# Patient Record
Sex: MALE | Race: WHITE | Employment: UNEMPLOYED | URBAN - NONMETROPOLITAN AREA
[De-identification: names, ages, dates, MRNs, and addresses within clinical notes are randomized per-mention and may not be internally consistent; named-entity substitution may affect disease eponyms.]

---

## 2020-12-23 ENCOUNTER — TELEPHONE (OUTPATIENT)
Dept: INFUSION THERAPY | Age: 50
End: 2020-12-23

## 2020-12-23 NOTE — TELEPHONE ENCOUNTER
Left message with [facility] medical records in [place] for records request on pt to be faxed to [phone]. Spoke with [facility] [address] [facility] [place]) for records as well. States she has two ER visits and labs/imaging she will send to this fax number. No other records known.

## 2021-01-18 DIAGNOSIS — D72.819 LEUKOPENIA, UNSPECIFIED TYPE: Primary | ICD-10-CM

## 2021-01-18 NOTE — PROGRESS NOTES
Serology studies 11/25/2020 at [facility]  · WBC 3.1  · RBC 4.25  · HGB 13.6  · HCT 41.3  · MCV 97.2  · MCH 32  · MCHC 32.9  ·   · ANC 1.53    CBC at initial consultation on 1/19/2021 documented a WBC of 2.77, ANC 1.35, ALC 0.97, ANC 0.28, hemoglobin 13.3, MCV 96.1 and a platelet count of 452,671. HEMATOLOGIC HISTORY:  Diagnosis:  · Leukopenia with neutropenia  · Mild thrombocytopenia      Treatment summary:  · Work-up in progress        Past Medical History:    Past Medical History:   Diagnosis Date    Anxiety     Vertigo     WBC decreased        Past Surgical History:    No past surgical history on file. Current Medications:    Current Outpatient Medications   Medication Sig Dispense Refill    meclizine (ANTIVERT) 25 MG tablet       vitamin D (ERGOCALCIFEROL) 1.25 MG (04624 UT) CAPS capsule Take 1 capsule by mouth once a week 8 capsule 0    levothyroxine (SYNTHROID) 50 MCG tablet Take 1 tablet by mouth daily 90 tablet 1     No current facility-administered medications for this visit. Allergies: No Known Allergies    Social History:    Social History     Tobacco Use    Smoking status: Current Every Day Smoker     Packs/day: 1.00     Years: 41.00     Pack years: 41.00     Types: Cigarettes     Start date: 1/1/1980    Smokeless tobacco: Never Used   Substance Use Topics    Alcohol use: Not on file    Drug use: Not on file       Family History:   No family history on file. Vitals:  Vitals:    01/19/21 1116   BP: 110/68   Pulse: 107   Temp: 97.7 °F (36.5 °C)   TempSrc: Temporal   SpO2: 98%   Weight: 116 lb 14.4 oz (53 kg)   Height: 5' 9.5\" (1.765 m)        Subjective   REVIEW OF SYSTEMS:   Review of Systems   Constitutional: Positive for fatigue and unexpected weight change. Negative for chills, diaphoresis and fever. HENT: Positive for dental problem. Negative for congestion, ear pain, hearing loss, nosebleeds, sore throat and tinnitus. Eyes: Negative. Negative for pain, discharge and redness. Respiratory: Negative. Negative for cough, shortness of breath and wheezing. Cardiovascular: Negative. Negative for chest pain, palpitations and leg swelling. Gastrointestinal: Negative. Negative for abdominal pain, blood in stool, constipation, diarrhea, nausea and vomiting. Endocrine: Negative for polydipsia. Genitourinary: Negative for dysuria, flank pain, frequency, hematuria and urgency. Musculoskeletal: Negative. Negative for back pain, myalgias and neck pain. Skin: Negative. Negative for rash. Neurological: Positive for dizziness, weakness and light-headedness. Negative for tremors, seizures and headaches. Hematological: Does not bruise/bleed easily. Psychiatric/Behavioral: Positive for sleep disturbance. The patient is not nervous/anxious. Objective   PHYSICAL EXAM:  Physical Exam  Vitals signs reviewed. Constitutional:       General: He is not in acute distress. Appearance: He is cachectic. He is ill-appearing. He is not diaphoretic. HENT:      Head: Normocephalic and atraumatic. Mouth/Throat:      Dentition: Dental caries and dental abscesses present. Pharynx: Uvula midline. Tonsils: No tonsillar exudate. Eyes:      General: Lids are normal. No scleral icterus. Right eye: No discharge. Left eye: No discharge. Conjunctiva/sclera: Conjunctivae normal.      Pupils: Pupils are equal, round, and reactive to light. Neck:      Musculoskeletal: Normal range of motion and neck supple. Thyroid: No thyroid mass or thyromegaly. Vascular: No JVD. Trachea: Trachea normal. No tracheal deviation. Cardiovascular:      Rate and Rhythm: Normal rate and regular rhythm. Heart sounds: Normal heart sounds. No murmur. No friction rub. No gallop. Pulmonary:      Effort: Pulmonary effort is normal. No respiratory distress. Breath sounds: Normal breath sounds. No wheezing or rales. Chest:      Chest wall: No tenderness. Abdominal:      General: Bowel sounds are normal. There is no distension. Palpations: Abdomen is soft. There is no mass. Tenderness: There is no abdominal tenderness. There is no guarding or rebound. Hernia: No hernia is present. Musculoskeletal:         General: No tenderness or deformity. Comments: Range of motion within normal limits x4 extremities  Muscle wasting   Skin:     General: Skin is warm. Coloration: Skin is not pale. Findings: No erythema or rash. Neurological:      Mental Status: He is alert and oriented to person, place, and time. Cranial Nerves: No cranial nerve deficit. Coordination: Coordination normal.   Psychiatric:         Behavior: Behavior normal.         Thought Content: Thought content normal.         Labs reviewed today:  Lab Results   Component Value Date    WBC 2.6 (L) 01/19/2021    HGB 12.8 (L) 01/19/2021    HCT 37.0 (L) 01/19/2021    MCV 91 01/19/2021     01/19/2021     Lab Results   Component Value Date    NEUTROABS 1.5 01/19/2021       ASSESSMENT/PLAN:      1. Anemia associated with nutritional deficiency, hemoglobin 13.3 with an MCV of 96.1. Denied known history of iron or vitamin B12 deficiency. Poor oral dentition limiting diet to soft foods only    Labs to be obtained today to evaluate for mineral/vitamin deficiency:  - Comprehensive Metabolic Panel; Future  - Ferritin; Future  - Folate; Future  - Iron and TIBC; Future  - Lactate Dehydrogenase; Future  - Vitamin B12; Future  - Reticulocytes; Future  - Erythropoietin; Future  - Copper, Serum; Future  - Zinc; Future    2. Leukopenia with neutropenia denies any febrile illness. Reports occasional tooth abscess otherwise denies chronic infections.   WBC 3.77, ANC 1.35, ALC 0.97 and ANC 0.28    - Path Review, Smear; Future -Recommended to monitor closely for febrile illness and contact the clinic with a temperature greater than 100.4    3. Thrombocytopenia, mild with a platelet count of 696,941. Denies any significant ecchymosis. Denies any alcohol or illicit drug use. Labs to be obtained to rule out chronic inflammatory process, liver disease, and autoimmune disorder  - C-Reactive Protein; Future  - SUBHASH Screen with Reflex; Future  - D-Dimer, Quantitative; Future  - Fibrinogen; Future  - Hepatitis Panel, Acute; Future  - Sedimentation Rate; Future  - Rheumatoid Factor; Future  - Protime-INR; Future  - Protime/INR & PTT; Future    4. Chronic fatigue that has increased over the past 6 months. Reports poor sleeping habits due to routinely playing video games. Labs to be obtained to rule out hypothyroidism, vitamin D deficiency and HIV  - TSH without Reflex; Future  - HIV Rapid 1&2; Future  - Vitamin D 25 Hydroxy; Future    5. Recent weight loss, differential diagnosis to include poor oral mentation limiting dietary intake to soft foods only and malnutrition      FOLLOW UP:  1. Follow-up appointment given for 3 weeks  2. Continue to follow with other medical providers as recommended    Discussed precautions related to [address] and being at increased risk. Discussed proper handwashing to be done frequently, limit exposure to other individuals and maintain social distancing of 6 feet. Recommend contacting primary care provider if having respiratory symptoms for further recommendations and consideration for testing.     (Please note that portions of this note were completed with a voice recognition program. Efforts were made to edit the dictations but occasionally words are mis-transcribed,  Also, portions of this note have been copied forward, however, changed to reflect the most current clinical status of this patient.) Sarah Hook, sirena scribing for ROCIO Kemp. Electronically signed by Keeley Wise RN on 1/19/2021 at 2:56 PM CST. I, ROCIO Esposito personally performed the services described in this documentation as scribed by Keeley Wise RN in my presence and is both accurate and complete.     Electronically signed by ROCIO Kemp on 1/24/2021 at 3:16 PM

## 2021-01-19 ENCOUNTER — HOSPITAL ENCOUNTER (OUTPATIENT)
Dept: INFUSION THERAPY | Age: 51
Discharge: HOME | End: 2021-01-19
Payer: MEDICAID

## 2021-01-19 ENCOUNTER — TELEPHONE (OUTPATIENT)
Dept: HEMATOLOGY | Age: 51
End: 2021-01-19

## 2021-01-19 ENCOUNTER — OFFICE VISIT (OUTPATIENT)
Dept: HEMATOLOGY | Age: 51
End: 2021-01-19
Payer: MEDICAID

## 2021-01-19 VITALS
WEIGHT: 116.9 LBS | OXYGEN SATURATION: 98 % | HEIGHT: 70 IN | DIASTOLIC BLOOD PRESSURE: 68 MMHG | TEMPERATURE: 97.7 F | SYSTOLIC BLOOD PRESSURE: 110 MMHG | BODY MASS INDEX: 16.73 KG/M2 | HEART RATE: 107 BPM

## 2021-01-19 DIAGNOSIS — D50.8 OTHER IRON DEFICIENCY ANEMIA: ICD-10-CM

## 2021-01-19 DIAGNOSIS — E03.9 HYPOTHYROIDISM, UNSPECIFIED TYPE: Primary | ICD-10-CM

## 2021-01-19 DIAGNOSIS — D70.8 OTHER NEUTROPENIA (HCC): ICD-10-CM

## 2021-01-19 DIAGNOSIS — D72.819 LEUKOPENIA, UNSPECIFIED TYPE: ICD-10-CM

## 2021-01-19 DIAGNOSIS — D53.9 ANEMIA ASSOCIATED WITH NUTRITIONAL DEFICIENCY: Primary | ICD-10-CM

## 2021-01-19 DIAGNOSIS — R53.83 OTHER FATIGUE: ICD-10-CM

## 2021-01-19 DIAGNOSIS — D69.6 THROMBOCYTOPENIA (HCC): ICD-10-CM

## 2021-01-19 DIAGNOSIS — R63.4 RECENT WEIGHT LOSS: ICD-10-CM

## 2021-01-19 LAB
ALBUMIN SERPL-MCNC: 4.1 G/DL (ref 3.5–5.2)
ALP BLD-CCNC: 95 U/L (ref 40–130)
ALT SERPL-CCNC: 11 U/L (ref 21–72)
ANION GAP SERPL CALCULATED.3IONS-SCNC: 9 MMOL/L (ref 7–19)
AST SERPL-CCNC: 21 U/L (ref 17–59)
BASOPHILS ABSOLUTE: 0.02 K/UL (ref 0.01–0.08)
BASOPHILS RELATIVE PERCENT: 0.7 % (ref 0.1–1.2)
BILIRUB SERPL-MCNC: 0.7 MG/DL (ref 0.2–1.3)
BUN BLDV-MCNC: 7 MG/DL (ref 9–20)
CALCIUM SERPL-MCNC: 9 MG/DL (ref 8.4–10.2)
CHLORIDE BLD-SCNC: 102 MMOL/L (ref 98–111)
CO2: 30 MMOL/L (ref 22–29)
CREAT SERPL-MCNC: 0.9 MG/DL (ref 0.6–1.2)
EOSINOPHILS ABSOLUTE: 0.15 K/UL (ref 0.04–0.54)
EOSINOPHILS RELATIVE PERCENT: 5.4 % (ref 0.7–7)
FERRITIN: 80.1 NG/ML (ref 17.9–464)
FOLATE: 8 NG/ML (ref 2.7–20)
GFR NON-AFRICAN AMERICAN: >60
GLUCOSE BLD-MCNC: 93 MG/DL (ref 74–106)
HCT VFR BLD CALC: 39 % (ref 40.1–51)
HEMOGLOBIN: 13.3 G/DL (ref 13.7–17.5)
IRON SATURATION: 25 % (ref 14–50)
IRON: 101 UG/DL (ref 49–181)
LACTATE DEHYDROGENASE: 297 U/L (ref 313–618)
LYMPHOCYTES ABSOLUTE: 0.97 K/UL (ref 1.18–3.74)
LYMPHOCYTES RELATIVE PERCENT: 35 % (ref 19.3–53.1)
MCH RBC QN AUTO: 32.8 PG (ref 25.7–32.2)
MCHC RBC AUTO-ENTMCNC: 34.1 G/DL (ref 32.3–36.5)
MCV RBC AUTO: 96.1 FL (ref 79–92.2)
MONOCYTES ABSOLUTE: 0.28 K/UL (ref 0.24–0.82)
MONOCYTES RELATIVE PERCENT: 10.1 % (ref 4.7–12.5)
NEUTROPHILS ABSOLUTE: 1.35 K/UL (ref 1.56–6.13)
NEUTROPHILS RELATIVE PERCENT: 48.8 % (ref 34–71.1)
PDW BLD-RTO: 12.1 % (ref 11.6–14.4)
PLATELET # BLD: 139 K/UL (ref 163–337)
PMV BLD AUTO: 10.1 FL (ref 7.4–10.4)
POTASSIUM SERPL-SCNC: 3.5 MMOL/L (ref 3.5–5.1)
RBC # BLD: 4.06 M/UL (ref 4.63–6.08)
SODIUM BLD-SCNC: 141 MMOL/L (ref 137–145)
TOTAL IRON BINDING CAPACITY: 406 UG/DL (ref 261–462)
TOTAL PROTEIN: 7 G/DL (ref 6.3–8.2)
TSH SERPL DL<=0.05 MIU/L-ACNC: 10.3 UIU/ML (ref 0.47–4.68)
VITAMIN B-12: 333 PG/ML (ref 239–931)
WBC # BLD: 2.77 K/UL (ref 4.23–9.07)

## 2021-01-19 PROCEDURE — 82746 ASSAY OF FOLIC ACID SERUM: CPT

## 2021-01-19 PROCEDURE — 80053 COMPREHEN METABOLIC PANEL: CPT

## 2021-01-19 PROCEDURE — 99202 OFFICE O/P NEW SF 15 MIN: CPT

## 2021-01-19 PROCEDURE — 83540 ASSAY OF IRON: CPT

## 2021-01-19 PROCEDURE — 83615 LACTATE (LD) (LDH) ENZYME: CPT

## 2021-01-19 PROCEDURE — 84443 ASSAY THYROID STIM HORMONE: CPT

## 2021-01-19 PROCEDURE — 83550 IRON BINDING TEST: CPT

## 2021-01-19 PROCEDURE — 85025 COMPLETE CBC W/AUTO DIFF WBC: CPT

## 2021-01-19 PROCEDURE — 99204 OFFICE O/P NEW MOD 45 MIN: CPT | Performed by: NURSE PRACTITIONER

## 2021-01-19 PROCEDURE — 82728 ASSAY OF FERRITIN: CPT

## 2021-01-19 PROCEDURE — 82607 VITAMIN B-12: CPT

## 2021-01-19 RX ORDER — MECLIZINE HYDROCHLORIDE 25 MG/1
TABLET ORAL
COMMUNITY
Start: 2020-12-21

## 2021-01-19 RX ORDER — LEVOTHYROXINE SODIUM 0.05 MG/1
50 TABLET ORAL DAILY
Qty: 90 TABLET | Refills: 1 | Status: SHIPPED
Start: 2021-01-19 | End: 2021-03-23 | Stop reason: DRUGHIGH

## 2021-01-19 NOTE — TELEPHONE ENCOUNTER
Spoke with Karissa Walker related to his TSH of 10.300 indicating diagnosis of hypothyroidism and needed to be initiated on Synthroid 50 mcg daily. He voiced understanding and prescription was sent to [facility].

## 2021-01-22 ENCOUNTER — CLINICAL DOCUMENTATION (OUTPATIENT)
Dept: HEMATOLOGY | Age: 51
End: 2021-01-22

## 2021-01-22 DIAGNOSIS — E55.9 VITAMIN D DEFICIENCY: Primary | ICD-10-CM

## 2021-01-22 RX ORDER — ERGOCALCIFEROL 1.25 MG/1
50000 CAPSULE ORAL WEEKLY
Qty: 8 CAPSULE | Refills: 0 | Status: SHIPPED | OUTPATIENT
Start: 2021-01-22

## 2021-01-22 NOTE — PROGRESS NOTES
Please call Mayraish Rut and inform him that he has vitamin D deficiency and I sent a prescription for vitamin D 50,000 units weekly x8 to [facility]. After completing the 8-week dosing he will need to go to OTC vitamin D 2000 units daily.       Thanks ROCIO Zuniga, ADINAS

## 2021-01-24 ASSESSMENT — ENCOUNTER SYMPTOMS
EYE PAIN: 0
ABDOMINAL PAIN: 0
BLOOD IN STOOL: 0
VOMITING: 0
SHORTNESS OF BREATH: 0
COUGH: 0
DIARRHEA: 0
EYE DISCHARGE: 0
CONSTIPATION: 0
GASTROINTESTINAL NEGATIVE: 1
EYE REDNESS: 0
NAUSEA: 0
EYES NEGATIVE: 1
SORE THROAT: 0
WHEEZING: 0
RESPIRATORY NEGATIVE: 1
BACK PAIN: 0

## 2021-02-05 ASSESSMENT — ENCOUNTER SYMPTOMS
WHEEZING: 0
EYE PAIN: 0
BLOOD IN STOOL: 0
EYE REDNESS: 0
SHORTNESS OF BREATH: 0
DIARRHEA: 0
VOMITING: 0
CONSTIPATION: 0
BACK PAIN: 0
SORE THROAT: 0
ABDOMINAL PAIN: 0
EYES NEGATIVE: 1
GASTROINTESTINAL NEGATIVE: 1
NAUSEA: 0
EYE DISCHARGE: 0

## 2021-02-05 NOTE — PROGRESS NOTES
Progress Note      Pt Name: Cathy Love  YOB: 1970  MRN: [id number]    Date of evaluation: 2/8/2021  History Obtained From:  patient, electronic medical record    CHIEF COMPLAINT:    Chief Complaint   Patient presents with    Follow-up     Leukopenia with neutropenia    Anemia    Discuss Labs    Other     Hypothyroidism     HISTORY OF PRESENT ILLNESS:    Cathy Love is a 46 y.o.  male returns today in follow-up for leukopenia with neutropenia and mild thrombocytopenia with initial work-up revealing vitamin D deficiency and hypothyroidism. Padma Pinto presented initially on 1/19/2021 with complaint of lightheadedness with episodes of dizziness, fatigue, anxiety, and weight loss with a good appetite and suggested his weight loss is from poor oral dentation with frequent dental abscesses (reported 10 pound weight loss over the past 2 months). He denied any febrile illness, night sweats or enlarged lymph nodes. Padma Pinto presents today with continued complaint dizziness and reports recently developing a cough with clear secretions. Denies any shortness of air or chest pain. He continues to deny any B symptoms or other acute illness.     I reviewed the following lab results and recommendations with Padma Pinto today:    Serology studies on 1/19/2021:  · TSH 10.300  · Vitamin B-12 333  · Folate 8.0  · Ferritin 80.1  ·  ([id number])  · Iron 101/iron saturation 25%  · TIBC 406  · Creatinine 0.9/GFR >60  · Calcium 9.0  · Total protein 7.0  · Albumin 4.1  · Total bilirubin 0.7  · Alkaline phosphatase 95  · ALT 11/AST 21  · Hepatitis panel negative  · CRP 2  · Sed rate 12  · RA <10  · Erythropoietin 8.5  · Absolute reticulocyte count 0.8%  · Zinc 46  · Copper 84  · Fibrinogen 300  · SUBHASH negative  · D-dimer <0.20  · PTT 29  · PT 10.9/INR 1.0  · HIV nonreactive  · Vitamin D 6.5 · Peripheral blood smear-leukopenia confirmed no morphologic abnormality. Normochromic, normocytic anemia without significant morphology abnormality. Platelet count morphology normal.    Initiated on vitamin D 50,000 unit with x8 weeks on 1/22/2021 to be followed by oral vitamin D 2000 units daily and on levothyroxine 50 mcg daily on 1/19/2021. He reports currently taking both medications as prescribed. CBC today continues to indicate neutropenia with leukopenia and mild thrombocytopenia with a WBC of 2.54, ANC 1.20, ALC 0.91, hemoglobin 12.6, MCV 99.7 and a platelet count of 911,904. A bone marrow aspiration biopsy and an ultrasound of the abdomen is warranted for further evaluation. HEMATOLOGIC HISTORY:  Diagnosis:  · Leukopenia with neutropenia  · Mild thrombocytopenia  · Vitamin D deficiency  · Hypothyroidism      Treatment summary:  · 1/22/2021 initiated on vitamin D 50,000 unit with x8 weeks to be followed by oral vitamin D 2000 units daily  · 1/19/2021 initiated levothyroxine 50 mcg daily. · Work-up in progress    Bladimir Pineda was seen on 1/19/2021 in initial hematology consultation for leukopenia with neutropenia, mild thrombocytopenia, fatigue and weight loss. lBadimir Pineda was referred from ROCIO Ward for further evaluation and treatment recommendations. Bladimir Pineda presented with complaint of lightheadedness with episodes of dizziness, fatigue, anxiety, and weight loss with a good appetite and suggested his weight loss is from poor oral dentation with frequent dental abscesses (reported 10 pound weight loss over the past 2 months). He denied any febrile illness, night sweats or enlarged lymph nodes. 's physical examination at initial consultation revealed a cachectic skeleton with no noted skin abnormalities or concerns, muscle wasting was noted. No enlarged lymph nodes or organomegaly identified. Very poor dentition with multiple dental caries and missing teeth. He reported smoking 1 pack of cigarettes daily for 41 years and denied any current alcohol or illicit drug use. He reported heavy alcohol use for 24 years with at least a half a case of beer and other alcoholic products for 24 years, quit at the age of 34 and he reported a history of illicit drug use of multiple substances from the age of [age]. He reported not sleeping well and stated that he is a  (clarified that he sat and played video games the majority of his time). 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 He denied routinely being seen by a medical provider for health maintenance. I reviewed the following available serology and radiographic studies at initial consultation appointment on 1/19/2021:    Chest x-ray 12/4/2020 at [facility] revealed no acute cardiac or pulmonary abnormality identified. Serology studies 11/25/2020 at [facility]  · WBC 3.1  · RBC 4.25  · HGB 13.6  · HCT 41.3  · MCV 97.2  · MCH 32  · MCHC 32.9  ·   · ANC 1.53    CBC at initial consultation on 1/19/2021 documented a WBC of 2.77, ANC 1.35, ALC 0.97, ANC 0.28, hemoglobin 13.3, MCV 96.1 and a platelet count of 210,787.     Serology studies on 1/19/2021:  · TSH 10.300  · Folate 8.0  · Ferritin 80.1  · Vitamin B12 333  ·  (194-431)  · Iron 101/iron saturation 25%  · TIBC 406  · Creatinine 0.9/GFR >60  · Calcium 9.0  · Total protein 7.0  · Albumin 4.1  · Total bilirubin 0.7  · Alkaline phosphatase 95  · ALT 11/AST 21  · Hepatitis panel negative  · CRP 2  · Sed rate 12  · RA <10  · Erythropoietin 8.5  · Absolute reticulocyte count 0.8%  · Zinc 46  · Copper 84  · Fibrinogen 300  · SUBHASH negative  · D-dimer <0.20 · PTT 29  · PT 10.9/INR 1.0  · HIV nonreactive  · Vitamin D 6.5  · Peripheral blood smear-leukopenia confirmed no morphologic abnormality. Normochromic, normocytic anemia without significant morphology abnormality. Platelet count morphology normal.    Initiated on vitamin D 50,000 unit with x8 weeks on 1/22/2021 to be followed by oral vitamin D 2000 units daily and on levothyroxine 50 mcg daily on 1/19/2021. Past Medical History:    Past Medical History:   Diagnosis Date    Anxiety     Vertigo     WBC decreased        Past Surgical History:    History reviewed. No pertinent surgical history. Current Medications:    Current Outpatient Medications   Medication Sig Dispense Refill    busPIRone (BUSPAR) 5 MG tablet Take 5 mg by mouth 3 times daily      vitamin D (ERGOCALCIFEROL) 1.25 MG (60095 UT) CAPS capsule Take 1 capsule by mouth once a week 8 capsule 0    meclizine (ANTIVERT) 25 MG tablet       levothyroxine (SYNTHROID) 50 MCG tablet Take 1 tablet by mouth daily 90 tablet 1     No current facility-administered medications for this visit. Allergies: No Known Allergies    Social History:    Social History     Tobacco Use    Smoking status: Current Every Day Smoker     Packs/day: 1.00     Years: 41.00     Pack years: 41.00     Types: Cigarettes     Start date: 1/1/1980    Smokeless tobacco: Never Used   Substance Use Topics    Alcohol use: Not Currently     Comment: history of alcoholism    Drug use: Not on file       Family History:   History reviewed. No pertinent family history. Vitals:  Vitals:    02/08/21 1009   BP: 116/76   Pulse: 71   Temp: 97.5 °F (36.4 °C)   SpO2: 97%   Weight: 119 lb 14.4 oz (54.4 kg)   Height: 5' 9.5\" (1.765 m)        Subjective   REVIEW OF SYSTEMS:   Review of Systems   Constitutional: Positive for fatigue. Negative for chills, diaphoresis and fever. · Copper 84    -Encouraged increasing protein and caloric intake  -CBC today      2. Leukopenia with neutropenia denies any febrile illness. Reports occasional tooth abscess otherwise denies chronic infections. CBC today continues to indicate neutropenia with leukopenia with a WBC of 2.54, ANC 1.20, and ALC 0.91. Continues to deny any febrile illness or infectious sequelae. Labs on 1/19/2021:  · Hepatitis panel negative  · CRP 2  · Sed rate 12  · RA <10  · HIV nonreactive  · Peripheral blood smear-leukopenia confirmed no morphologic abnormality. Normochromic, normocytic anemia without significant morphology abnormality. Platelet count morphology normal.      -Schedule bone marrow aspiration biopsy  -Contact the clinic with febrile illness with a temp greater than 100.4 or any other infectious sequelae      3. Thrombocytopenia, platelet count of 362,218 today, suspect this may be inaccurate due to blood sample obtained via fingerstick. At initial consultation blood draw from fingerstick documented a platelet count of 109,745 and on peripheral blood smear documented a platelet count of 915,244. Denies any significant ecchymosis and denies bleeding. Continues to deny illicit drug or alcohol use, has a significant history of both. Labs on 1/19/2021:  · Fibrinogen 300  · SUBHASH negative  · D-dimer <0.20  · PTT 29  · PT 10.9/INR 1.0    -Schedule ultrasound of the abdomen  -Monitor CBC    4. Hypothyroidism with chronic fatigue that has increased over the past 6 months. Reports poor sleeping habits due to routinely playing video games. TSH 10.300 on 1/19/2021 and initiated on levothyroxine 50 mcg daily.    -Continue levothyroxine 50 mcg daily  -Repeat TSH level in 2 months    5.  Recent weight loss, differential diagnosis to include poor oral mentation limiting dietary intake to soft foods only and malnutrition 6.  Vitamin D deficiency- Vitamin D 6.5 on 1/19/2021 and initiated on vitamin D 50,000 unit with x8 weeks on 1/22/2021 to be followed by oral vitamin D 2000 units daily and on   He reports currently taking as prescribed. -Continue vitamin D supplement    I discussed all of the above findings included in the assessment and plan with the patient and the patient is in agreement to move forward with current recommendations/treatment. I have addressed all of their questions and concerns that were verbalized. FOLLOW UP:  1. Follow-up appointment given for 1 week for bone marrow aspiration biopsy  2. Follow-up appointment to be given for 2 weeks after bone marrow aspiration biopsy  3. Continue to follow with other medical providers as recommended    Discussed precautions related to [address] and being at increased risk. Discussed proper handwashing to be done frequently, limit exposure to other individuals and maintain social distancing of 6 feet. Recommend contacting primary care provider if having respiratory symptoms for further recommendations and consideration for testing. (Please note that portions of this note were completed with a voice recognition program. Efforts were made to edit the dictations but occasionally words are mis-transcribed,  Also, portions of this note have been copied forward, however, changed to reflect the most current clinical status of this patient.)    Claudine DOLAN am scribing for Lue Spatz, APRN. Electronically signed by Claudine Aguilar RN on 1/19/2021 at 2:56 PM CST. Maryam DOLAN APRN personally performed the services described in this documentation as scribed by Claudine Aguilar RN in my presence and is both accurate and complete.     Electronically signed by Lue Spatz, APRN on 2/17/2021 at 10:52 AM

## 2021-02-08 ENCOUNTER — OFFICE VISIT (OUTPATIENT)
Dept: HEMATOLOGY | Age: 51
End: 2021-02-08
Payer: MEDICAID

## 2021-02-08 ENCOUNTER — HOSPITAL ENCOUNTER (OUTPATIENT)
Dept: INFUSION THERAPY | Age: 51
Discharge: HOME | End: 2021-02-08
Payer: MEDICAID

## 2021-02-08 VITALS
SYSTOLIC BLOOD PRESSURE: 116 MMHG | HEART RATE: 71 BPM | BODY MASS INDEX: 17.17 KG/M2 | WEIGHT: 119.9 LBS | HEIGHT: 70 IN | TEMPERATURE: 97.5 F | OXYGEN SATURATION: 97 % | DIASTOLIC BLOOD PRESSURE: 76 MMHG

## 2021-02-08 DIAGNOSIS — R63.4 RECENT WEIGHT LOSS: ICD-10-CM

## 2021-02-08 DIAGNOSIS — R53.83 OTHER FATIGUE: ICD-10-CM

## 2021-02-08 DIAGNOSIS — D72.819 LEUKOPENIA, UNSPECIFIED TYPE: ICD-10-CM

## 2021-02-08 DIAGNOSIS — E55.9 VITAMIN D DEFICIENCY: ICD-10-CM

## 2021-02-08 DIAGNOSIS — D53.9 ANEMIA ASSOCIATED WITH NUTRITIONAL DEFICIENCY: ICD-10-CM

## 2021-02-08 DIAGNOSIS — D70.8 OTHER NEUTROPENIA (HCC): ICD-10-CM

## 2021-02-08 DIAGNOSIS — D69.6 THROMBOCYTOPENIA (HCC): Primary | ICD-10-CM

## 2021-02-08 DIAGNOSIS — E03.9 HYPOTHYROIDISM, UNSPECIFIED TYPE: ICD-10-CM

## 2021-02-08 LAB
BASOPHILS ABSOLUTE: 0.02 K/UL (ref 0.01–0.08)
BASOPHILS RELATIVE PERCENT: 0.8 % (ref 0.1–1.2)
EOSINOPHILS ABSOLUTE: 0.12 K/UL (ref 0.04–0.54)
EOSINOPHILS RELATIVE PERCENT: 4.7 % (ref 0.7–7)
HCT VFR BLD CALC: 38.7 % (ref 40.1–51)
HEMOGLOBIN: 12.6 G/DL (ref 13.7–17.5)
LYMPHOCYTES ABSOLUTE: 0.91 K/UL (ref 1.18–3.74)
LYMPHOCYTES RELATIVE PERCENT: 35.8 % (ref 19.3–53.1)
MCH RBC QN AUTO: 32.5 PG (ref 25.7–32.2)
MCHC RBC AUTO-ENTMCNC: 32.6 G/DL (ref 32.3–36.5)
MCV RBC AUTO: 99.7 FL (ref 79–92.2)
MONOCYTES ABSOLUTE: 0.29 K/UL (ref 0.24–0.82)
MONOCYTES RELATIVE PERCENT: 11.4 % (ref 4.7–12.5)
NEUTROPHILS ABSOLUTE: 1.2 K/UL (ref 1.56–6.13)
NEUTROPHILS RELATIVE PERCENT: 47.3 % (ref 34–71.1)
PDW BLD-RTO: 12.2 % (ref 11.6–14.4)
PLATELET # BLD: 108 K/UL (ref 163–337)
PMV BLD AUTO: 10.2 FL (ref 7.4–10.4)
RBC # BLD: 3.88 M/UL (ref 4.63–6.08)
WBC # BLD: 2.54 K/UL (ref 4.23–9.07)

## 2021-02-08 PROCEDURE — 99214 OFFICE O/P EST MOD 30 MIN: CPT | Performed by: NURSE PRACTITIONER

## 2021-02-08 PROCEDURE — 99211 OFF/OP EST MAY X REQ PHY/QHP: CPT

## 2021-02-08 PROCEDURE — 85025 COMPLETE CBC W/AUTO DIFF WBC: CPT

## 2021-02-08 RX ORDER — BUSPIRONE HYDROCHLORIDE 5 MG/1
5 TABLET ORAL 3 TIMES DAILY
COMMUNITY

## 2021-02-17 ASSESSMENT — ENCOUNTER SYMPTOMS: COUGH: 1

## 2021-03-02 ENCOUNTER — HOSPITAL ENCOUNTER (OUTPATIENT)
Dept: ULTRASOUND IMAGING | Age: 51
Discharge: HOME | End: 2021-03-02
Payer: MEDICAID

## 2021-03-02 DIAGNOSIS — D69.6 THROMBOCYTOPENIA (HCC): ICD-10-CM

## 2021-03-02 PROCEDURE — 76700 US EXAM ABDOM COMPLETE: CPT

## 2021-03-08 ENCOUNTER — HOSPITAL ENCOUNTER (OUTPATIENT)
Dept: INFUSION THERAPY | Age: 51
Discharge: HOME | End: 2021-03-08
Payer: MEDICAID

## 2021-03-08 ENCOUNTER — OFFICE VISIT (OUTPATIENT)
Dept: HEMATOLOGY | Age: 51
End: 2021-03-08
Payer: MEDICAID

## 2021-03-08 VITALS
TEMPERATURE: 98.4 F | BODY MASS INDEX: 17.58 KG/M2 | DIASTOLIC BLOOD PRESSURE: 70 MMHG | HEART RATE: 81 BPM | OXYGEN SATURATION: 99 % | SYSTOLIC BLOOD PRESSURE: 120 MMHG | HEIGHT: 70 IN | WEIGHT: 122.8 LBS

## 2021-03-08 DIAGNOSIS — D53.9 ANEMIA ASSOCIATED WITH NUTRITIONAL DEFICIENCY: ICD-10-CM

## 2021-03-08 DIAGNOSIS — D72.819 LEUKOPENIA, UNSPECIFIED TYPE: ICD-10-CM

## 2021-03-08 DIAGNOSIS — D69.6 THROMBOCYTOPENIA (HCC): ICD-10-CM

## 2021-03-08 DIAGNOSIS — E03.9 HYPOTHYROIDISM, UNSPECIFIED TYPE: Primary | ICD-10-CM

## 2021-03-08 DIAGNOSIS — R63.4 RECENT WEIGHT LOSS: ICD-10-CM

## 2021-03-08 DIAGNOSIS — E55.9 VITAMIN D DEFICIENCY: ICD-10-CM

## 2021-03-08 DIAGNOSIS — R53.83 OTHER FATIGUE: ICD-10-CM

## 2021-03-08 DIAGNOSIS — D70.8 OTHER NEUTROPENIA (HCC): ICD-10-CM

## 2021-03-08 LAB
ALBUMIN SERPL-MCNC: 3.6 G/DL (ref 3.5–5.2)
ALP BLD-CCNC: 96 U/L (ref 40–130)
ALT SERPL-CCNC: 10 U/L (ref 21–72)
ANION GAP SERPL CALCULATED.3IONS-SCNC: 8 MMOL/L (ref 7–19)
AST SERPL-CCNC: 22 U/L (ref 17–59)
BASOPHILS ABSOLUTE: 0.01 K/UL (ref 0.01–0.08)
BASOPHILS RELATIVE PERCENT: 0.4 % (ref 0.1–1.2)
BILIRUB SERPL-MCNC: 0.5 MG/DL (ref 0.2–1.3)
BUN BLDV-MCNC: 11 MG/DL (ref 9–20)
CALCIUM SERPL-MCNC: 8.7 MG/DL (ref 8.4–10.2)
CHLORIDE BLD-SCNC: 102 MMOL/L (ref 98–111)
CO2: 31 MMOL/L (ref 22–29)
CREAT SERPL-MCNC: 0.8 MG/DL (ref 0.6–1.2)
EOSINOPHILS ABSOLUTE: 0.12 K/UL (ref 0.04–0.54)
EOSINOPHILS RELATIVE PERCENT: 4.9 % (ref 0.7–7)
GFR NON-AFRICAN AMERICAN: >60
GLUCOSE BLD-MCNC: 98 MG/DL (ref 74–106)
HCT VFR BLD CALC: 36.1 % (ref 40.1–51)
HEMOGLOBIN: 12.1 G/DL (ref 13.7–17.5)
LYMPHOCYTES ABSOLUTE: 0.7 K/UL (ref 1.18–3.74)
LYMPHOCYTES RELATIVE PERCENT: 28.3 % (ref 19.3–53.1)
MCH RBC QN AUTO: 31.6 PG (ref 25.7–32.2)
MCHC RBC AUTO-ENTMCNC: 33.5 G/DL (ref 32.3–36.5)
MCV RBC AUTO: 94.3 FL (ref 79–92.2)
MONOCYTES ABSOLUTE: 0.22 K/UL (ref 0.24–0.82)
MONOCYTES RELATIVE PERCENT: 8.9 % (ref 4.7–12.5)
NEUTROPHILS ABSOLUTE: 1.42 K/UL (ref 1.56–6.13)
NEUTROPHILS RELATIVE PERCENT: 57.5 % (ref 34–71.1)
PDW BLD-RTO: 12.2 % (ref 11.6–14.4)
PLATELET # BLD: 157 K/UL (ref 163–337)
PMV BLD AUTO: 9.8 FL (ref 7.4–10.4)
POTASSIUM SERPL-SCNC: 4.6 MMOL/L (ref 3.5–5.1)
RBC # BLD: 3.83 M/UL (ref 4.63–6.08)
SODIUM BLD-SCNC: 141 MMOL/L (ref 137–145)
TOTAL PROTEIN: 6.1 G/DL (ref 6.3–8.2)
TSH SERPL DL<=0.05 MIU/L-ACNC: 2.15 UIU/ML (ref 0.47–4.68)
WBC # BLD: 2.47 K/UL (ref 4.23–9.07)

## 2021-03-08 PROCEDURE — 99211 OFF/OP EST MAY X REQ PHY/QHP: CPT

## 2021-03-08 PROCEDURE — 80053 COMPREHEN METABOLIC PANEL: CPT

## 2021-03-08 PROCEDURE — 84443 ASSAY THYROID STIM HORMONE: CPT

## 2021-03-08 PROCEDURE — 38222 DX BONE MARROW BX & ASPIR: CPT | Performed by: NURSE PRACTITIONER

## 2021-03-08 PROCEDURE — 85025 COMPLETE CBC W/AUTO DIFF WBC: CPT

## 2021-03-08 PROCEDURE — 99214 OFFICE O/P EST MOD 30 MIN: CPT | Performed by: NURSE PRACTITIONER

## 2021-03-08 PROCEDURE — 36415 COLL VENOUS BLD VENIPUNCTURE: CPT

## 2021-03-08 RX ORDER — LIDOCAINE HYDROCHLORIDE 20 MG/ML
10 INJECTION, SOLUTION INFILTRATION; PERINEURAL ONCE
Status: DISCONTINUED | OUTPATIENT
Start: 2021-03-08 | End: 2021-03-10 | Stop reason: HOSPADM

## 2021-03-08 ASSESSMENT — ENCOUNTER SYMPTOMS
BLOOD IN STOOL: 0
EYE PAIN: 0
DIARRHEA: 0
SHORTNESS OF BREATH: 0
BACK PAIN: 0
NAUSEA: 0
VOMITING: 0
GASTROINTESTINAL NEGATIVE: 1
EYES NEGATIVE: 1
RESPIRATORY NEGATIVE: 1
COUGH: 0
EYE DISCHARGE: 0
ABDOMINAL PAIN: 0
WHEEZING: 0
EYE REDNESS: 0
CONSTIPATION: 0
SORE THROAT: 0

## 2021-03-08 NOTE — PROGRESS NOTES
Progress Note      Pt Name: Ronel Ochoa: 1970  MRN: [id number]    Date of evaluation: 3/8/2021  History Obtained From:  patient, electronic medical record    CHIEF COMPLAINT:    Chief Complaint   Patient presents with    Follow-up     Thrombocytopenia    Anemia    Other     Leukopenia with neutropenia    Discuss Labs     Vitamin D deficiency    Fatigue     Hypothyroidism     HISTORY OF PRESENT ILLNESS:    Adrián Cobb is a 46 y.o.  male who is currently being followed for leukopenia with neutropenia, mild thrombocytopenia, fatigue, weight loss and new findings of hypothyroidism and vitamin D deficiency. He was seen in initial consultation on 1/19/2021 and after reviewing his initial labs my recommendation was for bone marrow aspiration biopsy for further evaluation. Carlos Manuel Cobb returns today for a bone marrow aspiration biopsy, reviewed lab results, review ultrasound spleen results and further treatment recommendations. He presents today with no new complaints, denies any bleeding tendencies or febrile illness. He has a 6 pound weight increase since his initial visit on 1/19/2021. Carlos Manuel Cobb reports that he is taking his levothyroxine and vitamin D as recommended. I reviewed the following with Carlos Manuel Cobb today:  Serology on 1/19/2021:  · TSH 10.300  · Vitamin D 6.5  · Vitamin B12 333  · Folate 8.0  · Ferritin 80.1  ·   · Iron 101  · TIBC 406  · Iron saturation 25%  · Erythropoietin 8.5  · Creatinine 0.9/GFR >60  · Calcium 9.0  · Total bilirubin 0.7  · Alkaline phosphatase 95  · ALT 11/AST 21  · CRP 2  · Sed rate 12  · SUBHASH negative  · RA <10  · Reticulocyte count 0.8  · Zinc 46  · Copper 84  · Fibrinogen 300  · INR 1.0/PT 10.9/PTT 29  · D-dimer <0.20  · HIV nonreactive  · Hepatitis panel negative  · Peripheral blood smear confirmed leukopenia with no morphologic abnormality. Normocytic normochromic anemia without significant morphologic abnormality.   Platelet morphology and played video games the majority of his time). Melida Corral also reported that he is the sole caregiver for his mother who has dementia. He denied routinely being seen by a medical provider for health maintenance. I reviewed the following available serology and radiographic studies at initial consultation appointment on 1/19/2021:    Chest x-ray 12/4/2020 at [facility] revealed no acute cardiac or pulmonary abnormality identified. Serology studies 11/25/2020 at [facility]  · WBC 3.1  · RBC 4.25  · HGB 13.6  · HCT 41.3  · MCV 97.2  · MCH 32  · MCHC 32.9  ·   · ANC 1.53    CBC at initial consultation on 1/19/2021 documented a WBC of 2.77, ANC 1.35, ALC 0.97, ANC 0.28, hemoglobin 13.3, MCV 96.1 and a platelet count of 658,886. Serology on 1/19/2021:  · TSH 10.300  · Vitamin D 6.5  · Vitamin B12 333  · Folate 8.0  · Ferritin 80.1  ·   · Iron 101  · TIBC 406  · Iron saturation 25%  · Erythropoietin 8.5  · Creatinine 0.9/GFR >60  · Calcium 9.0  · Total bilirubin 0.7  · Alkaline phosphatase 95  · ALT 11/AST 21  · CRP 2  · Sed rate 12  · SUBHASH negative  · RA <10  · Reticulocyte count 0.8  · Zinc 46  · Copper 84  · Fibrinogen 300  · INR 1.0/PT 10.9/PTT 29  · D-dimer <0.20  · HIV nonreactive  · Hepatitis panel negative  · Peripheral blood smear confirmed leukopenia with no morphologic abnormality. Normocytic normochromic anemia without significant morphologic abnormality. Platelet morphology appeared normal.    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 A few cyst in the liver with a benign appearance and to hyper echoic areas in the liver which most likely represent hemangiomas.     Bone marrow aspiration and biopsy on 3/8/2021: Results pending    Past Medical History:    Past Medical History:   Diagnosis Date    Anxiety     Vertigo     WBC decreased        Past Surgical History:    No past surgical history on file. Current Medications:    Current Outpatient Medications   Medication Sig Dispense Refill    vitamin D (ERGOCALCIFEROL) 1.25 MG (15691 UT) CAPS capsule Take 1 capsule by mouth once a week 8 capsule 0    meclizine (ANTIVERT) 25 MG tablet       levothyroxine (SYNTHROID) 50 MCG tablet Take 1 tablet by mouth daily 90 tablet 1    busPIRone (BUSPAR) 5 MG tablet Take 5 mg by mouth 3 times daily       No current facility-administered medications for this visit. Facility-Administered Medications Ordered in Other Visits   Medication Dose Route Frequency Provider Last Rate Last Admin    lidocaine 2 % injection 10 mL  10 mL Intradermal Once ROCIO Alberto            Allergies: No Known Allergies    Social History:    Social History     Tobacco Use    Smoking status: Current Every Day Smoker     Packs/day: 1.00     Years: 41.00     Pack years: 41.00     Types: Cigarettes     Start date: 1/1/1980    Smokeless tobacco: Never Used   Substance Use Topics    Alcohol use: Not Currently     Comment: history of alcoholism    Drug use: Not on file       Family History:   No family history on file. Vitals:  Vitals:    03/08/21 1033   BP: 120/70   Pulse: 81   Temp: 98.4 °F (36.9 °C)   SpO2: 99%   Weight: 122 lb 12.8 oz (55.7 kg)   Height: 5' 9.5\" (1.765 m)        Subjective   REVIEW OF SYSTEMS:   Review of Systems   Constitutional: Positive for fatigue (Chronic). Negative for chills, diaphoresis and fever. HENT: Negative. Negative for congestion, ear pain, hearing loss, nosebleeds, sore throat and tinnitus. Eyes: Negative. Negative for pain, discharge and redness. Respiratory: Negative. Negative for cough, shortness of breath and wheezing. Cardiovascular: Negative. Negative for chest pain, palpitations and leg swelling. Gastrointestinal: Negative.   Negative for abdominal pain, blood in stool, constipation, diarrhea, nausea and vomiting. Endocrine: Negative for polydipsia. Genitourinary: Negative for dysuria, flank pain, frequency, hematuria and urgency. Musculoskeletal: Negative. Negative for back pain, myalgias and neck pain. Skin: Negative. Negative for rash. Neurological: Positive for dizziness (No change from previous evaluation). Negative for tremors, seizures, weakness and headaches. Hematological: Does not bruise/bleed easily. Psychiatric/Behavioral: Negative. The patient is not nervous/anxious. Objective   PHYSICAL EXAM:  Physical Exam  Vitals signs reviewed. Constitutional:       General: He is not in acute distress. Appearance: He is well-developed. He is cachectic. He is ill-appearing. He is not diaphoretic. HENT:      Head: Normocephalic and atraumatic. Mouth/Throat:      Dentition: Dental caries present. Pharynx: Uvula midline. Tonsils: No tonsillar exudate. Eyes:      General: Lids are normal. No scleral icterus. Right eye: No discharge. Left eye: No discharge. Conjunctiva/sclera: Conjunctivae normal.      Pupils: Pupils are equal, round, and reactive to light. Neck:      Musculoskeletal: Normal range of motion and neck supple. Thyroid: No thyroid mass or thyromegaly. Vascular: No JVD. Trachea: Trachea normal. No tracheal deviation. Cardiovascular:      Rate and Rhythm: Normal rate and regular rhythm. Heart sounds: Normal heart sounds. No murmur. No friction rub. No gallop. Pulmonary:      Effort: Pulmonary effort is normal. No respiratory distress. Breath sounds: Normal breath sounds. No wheezing or rales. Chest:      Chest wall: No tenderness. Abdominal:      General: Bowel sounds are normal. There is no distension. Palpations: Abdomen is soft. There is no mass. Tenderness: There is no abdominal tenderness. There is no guarding or rebound.       Hernia: No hernia is present. Musculoskeletal:         General: No tenderness or deformity. Comments: Range of motion within normal limits x4 extremities   Skin:     General: Skin is warm. Coloration: Skin is not pale. Findings: No erythema or rash. Neurological:      Mental Status: He is alert and oriented to person, place, and time. Cranial Nerves: No cranial nerve deficit. Coordination: Coordination normal.   Psychiatric:         Behavior: Behavior normal.         Thought Content: Thought content normal.         Labs reviewed today:  Lab Results   Component Value Date    WBC 2.47 (L) 03/08/2021    HGB 12.1 (L) 03/08/2021    HCT 36.1 (L) 03/08/2021    MCV 94.3 (H) 03/08/2021     (L) 03/08/2021     Lab Results   Component Value Date    NEUTROABS 1.42 (L) 03/08/2021       ASSESSMENT/PLAN:      1. Anemia associated with nutritional deficiency, no evidence of iron, vitamin B12, copper or zinc deficiency. Hemoglobin stable at 12.1 with a hematocrit of 36.1. Serology on 1/19/2021:  · Vitamin B12 333  · Folate 8.0  · Ferritin 80.1  ·   · Iron 101  · TIBC 406  · Iron saturation 25%  · Erythropoietin 8.5  · Creatinine 0.9/GFR >60  · Calcium 9.0  · Reticulocyte count 0.8  · Zinc 46  · Copper 84    -Bone marrow aspiration biopsy today    2. Leukopenia with neutropenia denies any febrile illness. Reports occasional tooth abscess otherwise denies chronic infections. WBC 2.47, ANC 1.42 and ALC 0.70. Counts continue to slowly decline. Afebrile and denies any recent infectious sequelae WBC 3.77, ANC 1.35, ALC 0.97 and ANC 0.28. Peripheral blood smear confirmed leukopenia with no morphologic abnormality.     -Recommended to monitor closely for febrile illness and contact the clinic with a temperature greater than 100.4    3. Thrombocytopenia, presented with mild with a platelet count of 761,006. Platelet count improved to 157,000. Continues to deny any bleeding or significant ecchymosis.   Denies any alcohol or illicit drug use. Work-up does not suggest any liver disease, autoimmune disorder or chronic infectious/inflammatory process. Spleen in the upper limits of normal.  Currently unknown etiology of thrombocytopenia with differential of ITP. Serology on 1/19/2021  · Total bilirubin 0.7  · Alkaline phosphatase 95  · ALT 11/AST 21  · CRP 2  · Sed rate 12  · SUBHASH negative  · RA <10  · Fibrinogen 300  · INR 1.0/PT 10.9/PTT 29  · D-dimer <0.20  · HIV nonreactive  · Hepatitis panel negative  · Peripheral blood smear confirmed leukopenia with no morphologic abnormality. Normocytic normochromic anemia without significant morphologic abnormality. Platelet morphology appeared normal.    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 A few cyst in the liver with a benign appearance and to hyper echoic areas in the liver which most likely represent hemangiomas. 4.  Chronic fatigue that has increased over the past 6 months. Reports poor sleeping habits due to routinely playing video games. 5. Recent weight loss, differential diagnosis to include poor oral mentation limiting dietary intake to soft foods only and malnutrition. 6 pound weight increase over the past 6 weeks. 6.  Vitamin D deficiency, vitamin D 6.5 on 1/19/2021  -Initiated on Vitamin D 50,000 units weekly x8 initiated on 1/22/2021    7. Hypothyroidism, TSH 10.300 on 1/19/2021  -Initiated on levothyroxine 50 mcg daily  -Repeat TSH today    I discussed all of the above findings included in the assessment and plan with the patient and the patient is in agreement to move forward with current recommendations/treatment. I have addressed all of their questions and concerns that were verbalized. FOLLOW UP:  1. Follow-up appointment given for 2 weeks  2.  Continue to follow with other medical providers as recommended    Discussed precautions related to [address] and being at increased risk. Discussed proper handwashing to be done frequently, limit exposure to other individuals and maintain social distancing of 6 feet. Recommend contacting primary care provider if having respiratory symptoms for further recommendations and consideration for testing.     (Please note that portions of this note were completed with a voice recognition program. Efforts were made to edit the dictations but occasionally words are mis-transcribed,  Also, portions of this note have been copied forward, however, changed to reflect the most current clinical status of this patient.)    Electronically signed by ROCIO Mccurdy on 3/8/2021 at 4:52 PM

## 2021-03-08 NOTE — PROGRESS NOTES
Patient name: Juliet Reese  Patient : 1970  [id number]  3/8/2021    Procedure Note: Bone Marrow Aspirate and Biopsy    Indication: Pancytopenia      SITE: Right Iliac crest   After informed consent was obtained the patient was placed in the Left lateral ducubitus position. A time out was performed indicating the procedure and the patient, and all was comfirmed by the nurse. The Right posterior iliac crest was located and prepped and draped in a sterile fashion Lidocaine 1% was injected for local anesthesia. An Jamshidi needle was placed and a bone marrow aspirate was obtained. The Jamshidi needle was placed in a different location and a core biopsy was obtained. Pressure was held until homeostasis was obtained. A sterile dressing was applied. The patient tolerated the procedure well with no apparent complications. Estimated blood loss was minimal. The specimen was sent to pathology for flow cytometry, cytogenetics, histology and FISH if indicated.      Jennifer Parks RN assisted      ROCIO uHrt

## 2021-03-23 ENCOUNTER — TELEPHONE (OUTPATIENT)
Dept: INFUSION THERAPY | Age: 51
End: 2021-03-23

## 2021-03-23 ENCOUNTER — HOSPITAL ENCOUNTER (OUTPATIENT)
Dept: INFUSION THERAPY | Age: 51
Discharge: HOME | End: 2021-03-23
Payer: MEDICAID

## 2021-03-23 ENCOUNTER — OFFICE VISIT (OUTPATIENT)
Dept: HEMATOLOGY | Age: 51
End: 2021-03-23
Payer: MEDICAID

## 2021-03-23 VITALS
OXYGEN SATURATION: 99 % | BODY MASS INDEX: 16.91 KG/M2 | DIASTOLIC BLOOD PRESSURE: 70 MMHG | SYSTOLIC BLOOD PRESSURE: 122 MMHG | WEIGHT: 118.1 LBS | HEART RATE: 77 BPM | HEIGHT: 70 IN | TEMPERATURE: 96.9 F

## 2021-03-23 DIAGNOSIS — D72.819 LEUKOPENIA, UNSPECIFIED TYPE: ICD-10-CM

## 2021-03-23 DIAGNOSIS — E55.9 VITAMIN D DEFICIENCY: ICD-10-CM

## 2021-03-23 DIAGNOSIS — E03.9 HYPOTHYROIDISM, UNSPECIFIED TYPE: ICD-10-CM

## 2021-03-23 DIAGNOSIS — D72.819 LEUKOPENIA, UNSPECIFIED TYPE: Primary | ICD-10-CM

## 2021-03-23 DIAGNOSIS — R63.4 RECENT WEIGHT LOSS: ICD-10-CM

## 2021-03-23 DIAGNOSIS — D70.8 OTHER NEUTROPENIA (HCC): ICD-10-CM

## 2021-03-23 DIAGNOSIS — D53.9 ANEMIA ASSOCIATED WITH NUTRITIONAL DEFICIENCY: ICD-10-CM

## 2021-03-23 DIAGNOSIS — R53.83 OTHER FATIGUE: ICD-10-CM

## 2021-03-23 DIAGNOSIS — D69.6 THROMBOCYTOPENIA (HCC): ICD-10-CM

## 2021-03-23 LAB
ALBUMIN SERPL-MCNC: 4.2 G/DL (ref 3.5–5.2)
ALP BLD-CCNC: 108 U/L (ref 40–130)
ALT SERPL-CCNC: 9 U/L (ref 21–72)
ANION GAP SERPL CALCULATED.3IONS-SCNC: 11 MMOL/L (ref 7–19)
AST SERPL-CCNC: 22 U/L (ref 17–59)
BILIRUB SERPL-MCNC: 0.4 MG/DL (ref 0.2–1.3)
BUN BLDV-MCNC: 6 MG/DL (ref 9–20)
CALCIUM SERPL-MCNC: 9.1 MG/DL (ref 8.4–10.2)
CHLORIDE BLD-SCNC: 99 MMOL/L (ref 98–111)
CO2: 32 MMOL/L (ref 22–29)
CREAT SERPL-MCNC: 0.8 MG/DL (ref 0.6–1.2)
GFR NON-AFRICAN AMERICAN: >60
GLOBULIN: 2.9 G/DL
GLUCOSE BLD-MCNC: 93 MG/DL (ref 74–106)
POTASSIUM SERPL-SCNC: 4.4 MMOL/L (ref 3.5–5.1)
SODIUM BLD-SCNC: 142 MMOL/L (ref 137–145)
TOTAL PROTEIN: 7 G/DL (ref 6.3–8.2)
TSH SERPL DL<=0.05 MIU/L-ACNC: 14.2 UIU/ML (ref 0.47–4.68)

## 2021-03-23 PROCEDURE — 80053 COMPREHEN METABOLIC PANEL: CPT

## 2021-03-23 PROCEDURE — 84443 ASSAY THYROID STIM HORMONE: CPT

## 2021-03-23 PROCEDURE — 99214 OFFICE O/P EST MOD 30 MIN: CPT | Performed by: NURSE PRACTITIONER

## 2021-03-23 PROCEDURE — 99211 OFF/OP EST MAY X REQ PHY/QHP: CPT

## 2021-03-23 RX ORDER — LORATADINE 10 MG/1
TABLET ORAL
COMMUNITY
Start: 2021-03-15

## 2021-03-23 RX ORDER — LEVOTHYROXINE SODIUM 0.1 MG/1
100 TABLET ORAL DAILY
Qty: 30 TABLET | Refills: 5 | Status: SHIPPED | OUTPATIENT
Start: 2021-03-23

## 2021-03-23 ASSESSMENT — ENCOUNTER SYMPTOMS
RESPIRATORY NEGATIVE: 1
VOMITING: 0
BACK PAIN: 0
SORE THROAT: 0
EYES NEGATIVE: 1
EYE DISCHARGE: 0
ABDOMINAL PAIN: 0
SHORTNESS OF BREATH: 0
EYE REDNESS: 0
COUGH: 0
GASTROINTESTINAL NEGATIVE: 1
WHEEZING: 0
EYE PAIN: 0
DIARRHEA: 0
BLOOD IN STOOL: 0
NAUSEA: 0
CONSTIPATION: 0

## 2021-03-23 NOTE — TELEPHONE ENCOUNTER
----- Message from ROCIO Khan sent at 3/23/2021  3:34 PM CDT -----  Please call . Virgie Sameer and instruct him to change his levothyroxine dosing to 100 mcg daily.   He should have 50 mg tablets and can take 2 daily until he runs out and I have sent a new prescription for 100 mcg [facility]

## 2021-03-23 NOTE — PROGRESS NOTES
Progress Note      Pt Name: Dora Rivera: 1970  MRN: [id number]    Date of evaluation: 3/23/2021  History Obtained From:  patient, electronic medical record    CHIEF COMPLAINT:    Chief Complaint   Patient presents with    Follow-up     Hypothyroidism    Discuss Labs     Leukopenia with neutropenia    Fatigue    Other     Thrombocytopenia     HISTORY OF PRESENT ILLNESS:    Mulu Noriega is a 46 y.o.  male who is currently being followed for leukopenia with neutropenia, mild thrombocytopenia, fatigue, weight loss, hypothyroidism and vitamin D deficiency. He was seen in initial consultation on 1/19/2021 and after reviewing his initial labs my recommendation was for bone marrow aspiration biopsy for further evaluation. Philip Lugo returns today to review results from bone marrow aspiration biopsy on 3/8/2021. He presents today with continued complaints of episodes of vertigo and allergy symptoms. He reports his appetite is fair and he has a 4 pound weight loss over the past 2 weeks, initially had weight increase. Philip Lugo reports he continues to take his levothyroxine and vitamin D as recommended. I reviewed the following results with Philip Lugo:  Bone marrow aspiration and biopsy on 3/8/2021:   · Slightly hypocellular bone marrow for age (30% cellular) with trilineage hemopoiesis, no significant dyspoiesis and no increase in blasts. · No diagnostic evidence of lymphoproliferative (by flow cytometry), myelodysplastic process (negative FISH study), granulomatous or metastatic malignancy. · Increased stainable storage iron. · Normal male karyotype. CBC today shows improvement with a WBC of 3.56, ANC 2.52, hemoglobin 12.8 and a platelet count of 736,888. Leukopenia with neutropenia and thrombocytopenia have resolved. Differential diagnosis previous leukopenia with neutropenia could be related to a viral infection.   At this point recommendation is to continue with conservative monitoring and continue Synthroid and vitamin D replacement. HEMATOLOGIC HISTORY:  Diagnosis:  · Leukopenia with neutropenia  · Mild thrombocytopenia  · Hypothyroidism  · Vitamin D deficiency      Treatment summary:  · Work-up in progress  · Levothyroxine 50 mcg daily initiated on 1/19/2021  · Vitamin D 50,000 units weekly x8 initiated on 1/22/2021    Phoenix was seen in initial hematology consultation on 1/19/2021 for leukopenia with neutropenia, mild thrombocytopenia, fatigue and weight loss. Phoenix was referred from ROCIO Huntley for further evaluation and treatment recommendations. Phoenix presented with complaint of lightheadedness with episodes of dizziness, fatigue, anxiety, and weight loss with a good appetite and suggested his weight loss is from poor oral dentation with frequent dental abscesses (reported 10 pound weight loss over the past 2 months). He denied any febrile illness, night sweats or enlarged lymph nodes. 's physical examination at initial consultation revealed a cachectic skeleton with no noted skin abnormalities or concerns and muscle wasting was noted. No enlarged lymph nodes or organomegaly identified. Very poor dentition with multiple dental caries and missing teeth. He reported smoking 1 pack of cigarettes daily for 41 years and denied any alcohol or illicit drug use. He reported not sleeping well that he is a  (clarified that he sat and played video games the majority of his time). Phoenix also reported that he is the sole caregiver for his mother who has dementia. He denied routinely being seen by a medical provider for health maintenance. I reviewed the following available serology and radiographic studies at initial consultation appointment on 1/19/2021:    Chest x-ray 12/4/2020 at [facility] revealed no acute cardiac or pulmonary abnormality identified.     Serology studies 11/25/2020 at [facility]  · WBC 3.1  · RBC 4.25  · HGB 13.6  · HCT 41.3  · MCV 97.2  · MCH 32  · MCHC 32.9  ·   · ANC 1.53    CBC at initial consultation on 1/19/2021 documented a WBC of 2.77, ANC 1.35, ALC 0.97, ANC 0.28, hemoglobin 13.3, MCV 96.1 and a platelet count of 622,469. Serology on 1/19/2021:  · TSH 10.300  · Vitamin D 6.5  · Vitamin B12 333  · Folate 8.0  · Ferritin 80.1  ·   · Iron 101  · TIBC 406  · Iron saturation 25%  · Erythropoietin 8.5  · Creatinine 0.9/GFR >60  · Calcium 9.0  · Total bilirubin 0.7  · Alkaline phosphatase 95  · ALT 11/AST 21  · CRP 2  · Sed rate 12  · SUBHASH negative  · RA <10  · Reticulocyte count 0.8  · Zinc 46  · Copper 84  · Fibrinogen 300  · INR 1.0/PT 10.9/PTT 29  · D-dimer <0.20  · HIV nonreactive  · Hepatitis panel negative  · Peripheral blood smear confirmed leukopenia with no morphologic abnormality. Normocytic normochromic anemia without significant morphologic abnormality. Platelet morphology appeared normal.    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 A few cyst in the liver with a benign appearance and to hyper echoic areas in the liver which most likely represent hemangiomas. Bone marrow aspiration and biopsy on 3/8/2021:   · Slightly hypocellular bone marrow for age (30% cellular) with trilineage hemopoiesis, no significant dyspoiesis and no increase in blasts. · No diagnostic evidence of lymphoproliferative (by flow cytometry), myelodysplastic process (negative FISH study), granulomatous or metastatic malignancy. · Increased stainable storage iron. · Normal male karyotype.        Serology studies 3/8/2021  CBC  WBC 2.47  RBC 3.83  HGB 12.1  HCT 36.1  MCV 94.3  MCH 31.6  MCHC 33.5  ANC 1.42  ,000  TSH 2.15    Past Medical History:    Past Medical History:   Diagnosis Date    Anxiety     Vertigo     WBC decreased Past Surgical History:    No past surgical history on file. Current Medications:    Current Outpatient Medications   Medication Sig Dispense Refill    loratadine (CLARITIN) 10 MG tablet       busPIRone (BUSPAR) 5 MG tablet Take 5 mg by mouth 3 times daily      vitamin D (ERGOCALCIFEROL) 1.25 MG (37631 UT) CAPS capsule Take 1 capsule by mouth once a week 8 capsule 0    meclizine (ANTIVERT) 25 MG tablet       levothyroxine (SYNTHROID) 100 MCG tablet Take 1 tablet by mouth daily 30 tablet 5     No current facility-administered medications for this visit. Allergies: No Known Allergies    Social History:    Social History     Tobacco Use    Smoking status: Current Every Day Smoker     Packs/day: 1.00     Years: 41.00     Pack years: 41.00     Types: Cigarettes     Start date: 1/1/1980    Smokeless tobacco: Never Used   Substance Use Topics    Alcohol use: Not Currently     Comment: history of alcoholism    Drug use: Not on file       Family History:   No family history on file. Vitals:  Vitals:    03/23/21 1159   BP: 122/70   Pulse: 77   Temp: 96.9 °F (36.1 °C)   SpO2: 99%   Weight: 118 lb 1.6 oz (53.6 kg)   Height: 5' 9.5\" (1.765 m)        Subjective   REVIEW OF SYSTEMS:   Review of Systems   Constitutional: Positive for fatigue. Negative for chills, diaphoresis and fever. HENT: Negative. Negative for congestion, ear pain, hearing loss, nosebleeds, sore throat and tinnitus. Eyes: Negative. Negative for pain, discharge and redness. Respiratory: Negative. Negative for cough, shortness of breath and wheezing. Cardiovascular: Negative. Negative for chest pain, palpitations and leg swelling. Gastrointestinal: Negative. Negative for abdominal pain, blood in stool, constipation, diarrhea, nausea and vomiting. Endocrine: Negative for polydipsia. Genitourinary: Negative for dysuria, flank pain, frequency, hematuria and urgency. Musculoskeletal: Negative.   Negative for back pain, Mental Status: He is alert and oriented to person, place, and time. Cranial Nerves: No cranial nerve deficit. Coordination: Coordination normal.   Psychiatric:         Behavior: Behavior normal.         Thought Content: Thought content normal.         Labs reviewed today:  Lab Results   Component Value Date    WBC 2.47 (L) 03/08/2021    HGB 12.1 (L) 03/08/2021    HCT 36.1 (L) 03/08/2021    MCV 94.3 (H) 03/08/2021     (L) 03/08/2021     Lab Results   Component Value Date    NEUTROABS 1.42 (L) 03/08/2021     CBC today, 3/23/2021: WBC 3.56, ANC 2.52, hemoglobin 12.8, MCV 94.1, platelet count of 616,196 and absolute lymphocyte count of 0.72.      ASSESSMENT/PLAN:      1. Anemia associated with nutritional deficiency, no evidence of iron, vitamin B12, copper or zinc deficiency. Hemoglobin stable at 12.8 with a hematocrit of 38.1, today. 2.  Leukopenia with neutropenia of unknown etiology, improved and remains afebrile. No chronic infectious/inflammatory process other than occasional tooth abscess. WBC of 3.56, ANC 2.52, and ALC 0.72 today. Bone marrow aspiration and biopsy on 3/8/2021:   · Slightly hypocellular bone marrow for age (30% cellular) with trilineage hemopoiesis, no significant dyspoiesis and no increase in blasts. · No diagnostic evidence of lymphoproliferative (by flow cytometry), myelodysplastic process (negative FISH study), granulomatous or metastatic malignancy. · Increased stainable storage iron. · Normal male karyotype. Leukopenia with neutropenia and thrombocytopenia have resolved. Differential diagnosis previous leukopenia with neutropenia could be related to a viral infection. At this point recommendation is to continue with conservative monitoring.    -Recommended to monitor closely for febrile illness and contact the clinic with a temperature greater than 100.4    3. Thrombocytopenia, currently resolved with a platelet count of 838,214.   Denies any bleeding tendencies or significant ecchymosis. Again today denies alcohol or illicit drug use. Work-up does not suggest any liver disease, autoimmune disorder or chronic infectious/inflammatory process. Spleen in the upper limits of normal.      4.  Chronic fatigue, grade 1 and stable. Continues to have poor sleeping habits due to routinely playing video games. 5. Recent weight loss, differential diagnosis to include poor oral mentation limiting dietary intake to soft foods only and malnutrition. Initially had a 6 pound weight increase over the 6-week timeframe, today with a 4 pound weight loss. -Encourage nutritional substance    6. Vitamin D deficiency, vitamin D 6.5 on 1/19/2021  -Initiated on Vitamin D 50,000 units weekly x8 initiated on 1/22/2021  -Vitamin D level today    7. Hypothyroidism, TSH 10.300 on 1/19/2021. Initiated on levothyroxine 50 mcg daily with a follow-up TSH of 2.150 on 3/8/2021.  -Continue levothyroxine 50 mcg daily  -Repeat TSH today    I discussed all of the above findings included in the assessment and plan with the patient and the patient is in agreement to move forward with current recommendations/treatment. I have addressed all of their questions and concerns that were verbalized. FOLLOW UP:  1. Follow-up appointment given for 6 weeks  2. Continue to follow with other medical providers as recommended    Discussed precautions related to [address] and being at increased risk. Discussed proper handwashing to be done frequently, limit exposure to other individuals and maintain social distancing of 6 feet. Recommend contacting primary care provider if having respiratory symptoms for further recommendations and consideration for testing.     (Please note that portions of this note were completed with a voice recognition program. Efforts were made to edit the dictations but occasionally words are mis-transcribed,  Also, portions of this note have been copied forward, however, changed to reflect the most current clinical status of this patient.)    Roddie Gottron, am scribing for ROCIO Rodrigues. Electronically signed by Jannet Scruggs RN on 3/23/2021 at 1150. I, ROCIO Christian personally performed the services described in this documentation as scribed by Jannet Scruggs RN in my presence and is both accurate and complete.   Electronically signed by ROCIO Rodrigues on 4/7/2021 at 9:19 AM

## 2021-05-19 ENCOUNTER — TELEPHONE (OUTPATIENT)
Dept: HEMATOLOGY | Age: 51
End: 2021-05-19

## 2021-06-21 ENCOUNTER — TELEPHONE (OUTPATIENT)
Dept: NEUROLOGY | Age: 51
End: 2021-06-21

## 2021-06-21 NOTE — TELEPHONE ENCOUNTER
Called and spoke with the patient (second attempt) to let them know we have got the referral appointment scheduled with Dr. Daphne Roth. Patient is aware of appointment time, date, and the location.

## 2021-08-20 ENCOUNTER — OFFICE VISIT (OUTPATIENT)
Dept: NEUROLOGY | Age: 51
End: 2021-08-20
Payer: MEDICAID

## 2021-08-20 VITALS
BODY MASS INDEX: 19.26 KG/M2 | SYSTOLIC BLOOD PRESSURE: 104 MMHG | WEIGHT: 130 LBS | RESPIRATION RATE: 14 BRPM | HEIGHT: 69 IN | DIASTOLIC BLOOD PRESSURE: 74 MMHG | HEART RATE: 74 BPM

## 2021-08-20 DIAGNOSIS — R51.9 HEADACHE DISORDER: ICD-10-CM

## 2021-08-20 DIAGNOSIS — R42 DIZZINESS: Primary | ICD-10-CM

## 2021-08-20 PROCEDURE — 99204 OFFICE O/P NEW MOD 45 MIN: CPT | Performed by: PSYCHIATRY & NEUROLOGY

## 2021-08-20 NOTE — PROGRESS NOTES
Chief Complaint   Patient presents with    New Patient     Vertigo       Wilmer Boothe is a 46y.o. year old male who is seen for evaluation of dizziness. The patient indicates that over the last one year he has frequent issues with lightheadedness. He denies any clear vertigo but does indicates that movement in certain figures can trigger it. These included looking up and down and laying on his left side. He has no issues laying on his right side. He denied diplopia, dysarthria, dysphagia, weakness, numbness or ataxia. He cant characterize the duration of these events. Her does have some chronic hearing issues and tinnitus. He indicates his blood pressure with these events are unremarkable. Active Ambulatory Problems     Diagnosis Date Noted    Dizziness 08/20/2021    Headache disorder 08/20/2021     Resolved Ambulatory Problems     Diagnosis Date Noted    No Resolved Ambulatory Problems     Past Medical History:   Diagnosis Date    Anxiety     Vertigo     WBC decreased        History reviewed. No pertinent surgical history. History reviewed. No pertinent family history.     No Known Allergies    Social History     Socioeconomic History    Marital status: Single     Spouse name: Not on file    Number of children: Not on file    Years of education: Not on file    Highest education level: Not on file   Occupational History    Not on file   Tobacco Use    Smoking status: Current Every Day Smoker     Packs/day: 1.00     Years: 41.00     Pack years: 41.00     Types: Cigarettes     Start date: 1/1/1980    Smokeless tobacco: Never Used   Substance and Sexual Activity    Alcohol use: Not Currently     Comment: history of alcoholism    Drug use: Never    Sexual activity: Not on file   Other Topics Concern    Not on file   Social History Narrative    Not on file     Social Determinants of Health     Financial Resource Strain:     Difficulty of Paying Living Expenses:    Food Insecurity:     Worried About Running Out of Food in the Last Year:     Marisol of Food in the Last Year:    Transportation Needs:     Lack of Transportation (Medical):  Lack of Transportation (Non-Medical):    Physical Activity:     Days of Exercise per Week:     Minutes of Exercise per Session:    Stress:     Feeling of Stress :    Social Connections:     Frequency of Communication with Friends and Family:     Frequency of Social Gatherings with Friends and Family:     Attends Anabaptism Services:     Active Member of Clubs or Organizations:     Attends Club or Organization Meetings:     Marital Status:    Intimate Partner Violence:     Fear of Current or Ex-Partner:     Emotionally Abused:     Physically Abused:     Sexually Abused:        Review of Systems     Constitutional - No fever or chills. No diaphoresis or significant fatigue. HENT -  No tinnitus or significant hearing loss. Eyes - no sudden vision change or eye pain  Respiratory - no significant shortness of breath or cough  Cardiovascular - no chest pain No palpitations or significant leg swelling  Gastrointestinal - no abdominal swelling or pain. Genitourinary - No difficulty urinating, dysuria  Musculoskeletal - no back pain or myalgia. Skin - no color change or rash  Neurologic - No seizures. No lateralizing weakness. Hematologic - no easy bruising or excessive bleeding. Psychiatric - no severe anxiety or nervousness.    All other review of systems are negative.         Current Outpatient Medications   Medication Sig Dispense Refill    loratadine (CLARITIN) 10 MG tablet  (Patient not taking: Reported on 8/20/2021)      levothyroxine (SYNTHROID) 100 MCG tablet Take 1 tablet by mouth daily (Patient not taking: Reported on 8/20/2021) 30 tablet 5    busPIRone (BUSPAR) 5 MG tablet Take 5 mg by mouth 3 times daily (Patient not taking: Reported on 8/20/2021)      vitamin D (ERGOCALCIFEROL) 1.25 MG (94873 UT) CAPS capsule Take 1 capsule by mouth once a week (Patient not taking: Reported on 8/20/2021) 8 capsule 0    meclizine (ANTIVERT) 25 MG tablet  (Patient not taking: Reported on 8/20/2021)       No current facility-administered medications for this visit. /74   Pulse 74   Resp 14   Ht 5' 9\" (1.753 m)   Wt 130 lb (59 kg)   BMI 19.20 kg/m²     Constitutional - well developed, well nourished. Eyes - conjunctiva normal.  Pupils not tested  Ear, nose, throat -hearing intact to finger rub No scars, masses, or lesions over external nose or ears, no atrophy of tongue  Neck-symmetric, no masses noted, no jugular vein distension  Respiration- chest wall appears symmetric, good expansion,   normal effort without use of accessory muscles  Musculoskeletal - no significant wasting of muscles noted, no bony deformities  Extremities-no clubbing, cyanosis or edema  Skin - warm, dry, and intact. No rash, erythema, or pallor.   Psychiatric - mood, affect, and behavior appear normal.      Neurological exam  Awake, alert, fluent oriented x 3 appropriate affect  Attention and concentration appear appropriate  Recent and remote memory appears unremarkable  Speech normal without dysarthria  No clear issues with language of fund of knowledge    Cranial Nerve Exam   CN II- Visual fields grossly unremarkable  CN III, IV,VI-EOMI, No nystagmus, conjugate eye movements, no ptosis  CN V-sensation intact to LT over face  CN VII-no facial assymetry  CN VIII-Hearing intact to finger rub  CN IX and X- Palate not tested  CN XI-not test shoulder shrug  CN XII-Tongue midline with no fasciculations or fibrillations    Motor Exam  V/V throughout upper and lower extremities bilaterally, no cogwheeling, normal tone    Sensory Exam  Sensation intact to light touch and temperature upper and lower extremities bilaterally    Reflexes   2+ biceps bilaterally  2+ brachioradialis  2+patella  2+ ankle jerks  No clonus ankles  No Rowe's sign bilateral hands    Tremors- no tremors in hands or head noted    Gait  Normal base and speed  No ataxia    Coordination  Finger to nose-unremarkable    Lab Results   Component Value Date    [id number] 333 01/19/2021     Lab Results   Component Value Date    WBC 2.47 (L) 03/08/2021    HGB 12.1 (L) 03/08/2021    HCT 36.1 (L) 03/08/2021    MCV 94.3 (H) 03/08/2021     (L) 03/08/2021     Lab Results   Component Value Date     03/23/2021    K 4.4 03/23/2021    CL 99 03/23/2021    CO2 32 (H) 03/23/2021    BUN 6 (L) 03/23/2021    CREATININE 0.8 03/23/2021    GLUCOSE 93 03/23/2021    CALCIUM 9.1 03/23/2021    PROT 7.0 03/23/2021    LABALBU 4.2 03/23/2021    BILITOT 0.4 03/23/2021    ALKPHOS 108 03/23/2021    AST 22 03/23/2021    ALT 9 (L) 03/23/2021    LABGLOM >60 03/23/2021    GLOB 2.9 03/23/2021           Assessment    ICD-10-CM    1. Dizziness  R42 MRI BRAIN WO CONTRAST     CTA NECK W WO CONTRAST     CTA HEAD W WO CONTRAST     Ambulatory referral to Physical Therapy   2. Headache disorder  R51.9 MRI BRAIN WO CONTRAST     CTA HEAD W WO CONTRAST       His neurological examination including Eliazar Hallpike maneuver was unremarkable. Based upon her history and examination it is unclear what the etiology of these events are although a peripheral vestibulopathy is most likely due to the intermittent and positional nature of these symptoms. At this time he will be scheduled for an MRI of the brain and CTA of the head and neck for completeness. He will be referred to vestibular rehab for his dizziness. The patient indicated understanding of the management plan. He is call following testing to see what further management is warranted. Plan    Orders Placed This Encounter   Procedures    MRI BRAIN WO CONTRAST    CTA NECK W WO CONTRAST    CTA HEAD W [address] [facility] Ambulatory referral to Physical Therapy       No orders of the defined types were placed in this encounter. Return if symptoms worsen or fail to improve.       EMR Dragon/transcription disclaimer:Significant part of this  encounter note is electronic transcription/translation of spoken language to printed text. The electronic translation of spoken language may be erroneous, or at times, nonsensical words or phrases may be inadvertently transcribed.  Although I have reviewed the note for such errors, some may still exist.

## 2021-09-14 ENCOUNTER — APPOINTMENT (OUTPATIENT)
Dept: CT IMAGING | Age: 51
End: 2021-09-14
Payer: MEDICAID

## 2021-09-14 ENCOUNTER — HOSPITAL ENCOUNTER (OUTPATIENT)
Dept: MRI IMAGING | Age: 51
Discharge: HOME | End: 2021-09-14
Payer: MEDICAID

## 2021-09-14 DIAGNOSIS — R51.9 HEADACHE DISORDER: ICD-10-CM

## 2021-09-14 DIAGNOSIS — R42 DIZZINESS: ICD-10-CM

## 2021-09-14 PROCEDURE — 70551 MRI BRAIN STEM W/O DYE: CPT

## 2021-09-28 ENCOUNTER — TELEPHONE (OUTPATIENT)
Dept: NEUROLOGY | Age: 51
End: 2021-09-28

## 2021-09-28 NOTE — TELEPHONE ENCOUNTER
Solomon Chávez would like a call to discuss his MRI results.  Pt would like to discuss plan of care.  his preferred call back time is  Anytime

## 2021-10-01 NOTE — TELEPHONE ENCOUNTER
Returned patient call and gave MRI results per Dr Bartley Moritz, no acute finding, mild sinusitis. He reports that he is going to have PT in [address] [place] [address]